# Patient Record
Sex: FEMALE | Race: WHITE
[De-identification: names, ages, dates, MRNs, and addresses within clinical notes are randomized per-mention and may not be internally consistent; named-entity substitution may affect disease eponyms.]

---

## 2018-08-01 ENCOUNTER — HOSPITAL ENCOUNTER (EMERGENCY)
Dept: HOSPITAL 89 - ER | Age: 38
Discharge: HOME | End: 2018-08-01
Payer: COMMERCIAL

## 2018-08-01 VITALS — DIASTOLIC BLOOD PRESSURE: 60 MMHG | SYSTOLIC BLOOD PRESSURE: 105 MMHG

## 2018-08-01 DIAGNOSIS — N92.0: Primary | ICD-10-CM

## 2018-08-01 LAB
INR PPP: 1.03
PLATELET COUNT, AUTOMATED: 165 K/UL (ref 150–450)

## 2018-08-01 PROCEDURE — 85610 PROTHROMBIN TIME: CPT

## 2018-08-01 PROCEDURE — 82310 ASSAY OF CALCIUM: CPT

## 2018-08-01 PROCEDURE — 82435 ASSAY OF BLOOD CHLORIDE: CPT

## 2018-08-01 PROCEDURE — 82565 ASSAY OF CREATININE: CPT

## 2018-08-01 PROCEDURE — 85730 THROMBOPLASTIN TIME PARTIAL: CPT

## 2018-08-01 PROCEDURE — 82947 ASSAY GLUCOSE BLOOD QUANT: CPT

## 2018-08-01 PROCEDURE — 99283 EMERGENCY DEPT VISIT LOW MDM: CPT

## 2018-08-01 PROCEDURE — 82040 ASSAY OF SERUM ALBUMIN: CPT

## 2018-08-01 PROCEDURE — 85025 COMPLETE CBC W/AUTO DIFF WBC: CPT

## 2018-08-01 PROCEDURE — 84450 TRANSFERASE (AST) (SGOT): CPT

## 2018-08-01 PROCEDURE — 84520 ASSAY OF UREA NITROGEN: CPT

## 2018-08-01 PROCEDURE — 84460 ALANINE AMINO (ALT) (SGPT): CPT

## 2018-08-01 PROCEDURE — 86850 RBC ANTIBODY SCREEN: CPT

## 2018-08-01 PROCEDURE — 84075 ASSAY ALKALINE PHOSPHATASE: CPT

## 2018-08-01 PROCEDURE — 84702 CHORIONIC GONADOTROPIN TEST: CPT

## 2018-08-01 PROCEDURE — 84295 ASSAY OF SERUM SODIUM: CPT

## 2018-08-01 PROCEDURE — 86901 BLOOD TYPING SEROLOGIC RH(D): CPT

## 2018-08-01 PROCEDURE — 84132 ASSAY OF SERUM POTASSIUM: CPT

## 2018-08-01 PROCEDURE — 96360 HYDRATION IV INFUSION INIT: CPT

## 2018-08-01 PROCEDURE — 86900 BLOOD TYPING SEROLOGIC ABO: CPT

## 2018-08-01 PROCEDURE — 82374 ASSAY BLOOD CARBON DIOXIDE: CPT

## 2018-08-01 PROCEDURE — 82247 BILIRUBIN TOTAL: CPT

## 2018-08-01 PROCEDURE — 84155 ASSAY OF PROTEIN SERUM: CPT

## 2018-08-01 PROCEDURE — 36415 COLL VENOUS BLD VENIPUNCTURE: CPT

## 2018-08-01 NOTE — ER REPORT
History and Physical


Time Seen By MD:  09:00


Hx. of Stated Complaint:  


pt having vag bleeding, x 2 days, getting heavier.  Now starting to get 


lightheaded and still bleeding heavily.  dx with fibroids recently


HPI/ROS


CHIEF COMPLAINT: Abnormal vaginal bleeding





HISTORY OF PRESENT ILLNESS: 38-year-old female G2 para 2 comes emergency 

Department today with increased vaginal bleeding associated menses. Patient had 

had a recent ultrasound the last 2 weeks demonstrating large intrauterine 

fibroids and she has had a long history of fibroids and menorrhagia and 

metromenorrhagia with increased bleeding she said the last 2 days she soaked 

through multiple pads and tampons she said it's more than what she is used to 

and she was concerned she also says she felt a little lightheaded when she 

stood up quickly. Patient denies being pregnant but is no longer and has not 

been on birth control pills for quite some time. Patient has no other 

significant medical history denies fever chills or sweats nausea vomiting 

diarrhea or additional complaints she has no abdominal plain





REVIEW OF SYSTEMS:


Respiratory: No cough, no dyspnea.


Cardiovascular: No chest pain, no palpitations.


Gastrointestinal: No vomiting, no abdominal pain.


Musculoskeletal: No back pain.


Remainder of the 14 system rev:  Yes


Allergies:  


Coded Allergies:  


     levofloxacin (Verified  Allergy, Unknown, 8/1/18)


Home Meds


No Active Prescriptions or Reported Meds


Reviewed Nurses Notes:  Yes


Old Medical Records Reviewed:  Yes


Hx Substance Use Disorder:  No


Hx Alcohol Use:  No


Constitutional





Vital Sign - Last 24 Hours








 8/1/18 8/1/18 8/1/18 8/1/18





 08:50 08:52 09:00 09:01


 


Temp 97.5   


 


Pulse 98   90


 


Resp 20   


 


B/P (MAP) 137/87 137/87 (104) 118/67 (84) 


 


Pulse Ox 97   96


 


O2 Delivery Room Air   


 


    





 8/1/18 8/1/18 8/1/18 8/1/18





 09:06 09:11 09:16 09:19


 


Pulse 86 97 88 


 


B/P (MAP)    108/72 (84)


 


Pulse Ox 95 94 96 





 8/1/18 8/1/18 8/1/18 8/1/18





 09:20 09:21 09:26 09:26


 


Pulse    86


 


B/P (MAP) 119/83 (95) 113/87 (96) 108/72 (84) 





   118/83 (95) 





   113/87 (96) 


 


Pulse Ox  95  94





 8/1/18 8/1/18  





 09:30 09:31  


 


Pulse  83  


 


B/P (MAP) 108/83 (91)   


 


Pulse Ox  94  








Physical Exam


  General Appearance: The patient is alert, has no immediate need for airway 

protection and no current signs of toxicity.  [ ]


Eyes: Pupils equal and round no injection.


Respiratory: Chest is non tender, lungs are clear to auscultation.


Cardiac: regular rate and rhythm [ ]


Gastrointestinal: Abdomen is soft and non tender, no masses, bowel sounds 

normal.


Musculoskeletal:  Neck: Neck is supple and non tender.


   Extremities have full range of motion and are non tender.


Skin: No rashes or lesions.


[ ]


DIFFERENTIAL DIAGNOSIS: After history and physical exam differential diagnosis 

was considered for metromenorrhagia menorrhagia 2 anemia





Medical Decision Making


Data Points


Result Diagram:  


8/1/18 0900                                                                    

            8/1/18 0900





Laboratory





Hematology








Test


  8/1/18


09:00 8/1/18


09:16


 


Red Blood Count


  5.06 M/uL


(4.17-5.56) 


 


 


Mean Corpuscular Volume


  93.2 fL


(80.0-96.0) 


 


 


Mean Corpuscular Hemoglobin


  32.4 pg


(26.0-33.0) 


 


 


Mean Corpuscular Hemoglobin


Concent 34.8 g/dL


(32.0-36.0) 


 


 


Red Cell Distribution Width


  13.2 %


(11.5-14.5) 


 


 


Mean Platelet Volume


  9.8 fL


(7.2-11.1) 


 


 


Neutrophils (%) (Auto)


  55.3 %


(39.4-72.5) 


 


 


Lymphocytes (%) (Auto)


  32.7 %


(17.6-49.6) 


 


 


Monocytes (%) (Auto)


  8.8 %


(4.1-12.4) 


 


 


Eosinophils (%) (Auto)


  2.4 %


(0.4-6.7) 


 


 


Basophils (%) (Auto)


  0.8 %


(0.3-1.4) 


 


 


Nucleated RBC Relative Count


(auto) 0.1 /100WBC 


  


 


 


Neutrophils # (Auto)


  2.7 K/uL


(2.0-7.4) 


 


 


Lymphocytes # (Auto)


  1.6 K/uL


(1.3-3.6) 


 


 


Monocytes # (Auto)


  0.4 K/uL


(0.3-1.0) 


 


 


Eosinophils # (Auto)


  0.1 K/uL


(0.0-0.5) 


 


 


Basophils # (Auto)


  0.0 K/uL


(0.0-0.1) 


 


 


Nucleated RBC Absolute Count


(auto) 0.00 K/uL 


  


 


 


Peripheral Blood Smear No Y/N  


 


Sodium Level


  140 mmol/L


(137-145) 


 


 


Potassium Level


  3.5 mmol/L


(3.5-5.0) 


 


 


Chloride Level


  104 mmol/L


() 


 


 


Carbon Dioxide Level


  24 mmol/L


(22-31) 


 


 


Blood Urea Nitrogen


  12 mg/dl


(7-18) 


 


 


Creatinine


  0.80 mg/dl


(0.52-1.04) 


 


 


Glomerular Filtration Rate


Calc > 60.0 


  


 


 


Random Glucose


  114 mg/dl


() 


 


 


Calcium Level


  9.1 mg/dl


(8.4-10.2) 


 


 


Total Bilirubin


  0.4 mg/dl


(0.2-1.3) 


 


 


Aspartate Amino Transf


(AST/SGOT) 36 U/L (0-35) 


  


 


 


Alanine Aminotransferase


(ALT/SGPT) 20 U/L (0-56) 


  


 


 


Alkaline Phosphatase 51 U/L (0-126)  


 


Total Protein


  6.7 g/dl


(6.3-8.2) 


 


 


Albumin


  4.4 g/dl


(3.5-5.0) 


 


 


Prothrombin Time


  


  13.5 seconds


(12.0-14.4)


 


Prothromb Time International


Ratio 


  1.03 


 


 


Activated Partial


Thromboplast Time 


  31 seconds


(23-35)








Chemistry








Test


  8/1/18


09:00 8/1/18


09:16


 


White Blood Count


  4.9 k/uL


(4.5-11.0) 


 


 


Red Blood Count


  5.06 M/uL


(4.17-5.56) 


 


 


Hemoglobin


  16.4 g/dL


(12.0-16.0) 


 


 


Hematocrit


  47.1 %


(34.0-47.0) 


 


 


Mean Corpuscular Volume


  93.2 fL


(80.0-96.0) 


 


 


Mean Corpuscular Hemoglobin


  32.4 pg


(26.0-33.0) 


 


 


Mean Corpuscular Hemoglobin


Concent 34.8 g/dL


(32.0-36.0) 


 


 


Red Cell Distribution Width


  13.2 %


(11.5-14.5) 


 


 


Platelet Count


  165 K/uL


(150-450) 


 


 


Mean Platelet Volume


  9.8 fL


(7.2-11.1) 


 


 


Neutrophils (%) (Auto)


  55.3 %


(39.4-72.5) 


 


 


Lymphocytes (%) (Auto)


  32.7 %


(17.6-49.6) 


 


 


Monocytes (%) (Auto)


  8.8 %


(4.1-12.4) 


 


 


Eosinophils (%) (Auto)


  2.4 %


(0.4-6.7) 


 


 


Basophils (%) (Auto)


  0.8 %


(0.3-1.4) 


 


 


Nucleated RBC Relative Count


(auto) 0.1 /100WBC 


  


 


 


Neutrophils # (Auto)


  2.7 K/uL


(2.0-7.4) 


 


 


Lymphocytes # (Auto)


  1.6 K/uL


(1.3-3.6) 


 


 


Monocytes # (Auto)


  0.4 K/uL


(0.3-1.0) 


 


 


Eosinophils # (Auto)


  0.1 K/uL


(0.0-0.5) 


 


 


Basophils # (Auto)


  0.0 K/uL


(0.0-0.1) 


 


 


Nucleated RBC Absolute Count


(auto) 0.00 K/uL 


  


 


 


Peripheral Blood Smear No Y/N  


 


Glomerular Filtration Rate


Calc > 60.0 


  


 


 


Calcium Level


  9.1 mg/dl


(8.4-10.2) 


 


 


Total Bilirubin


  0.4 mg/dl


(0.2-1.3) 


 


 


Aspartate Amino Transf


(AST/SGOT) 36 U/L (0-35) 


  


 


 


Alanine Aminotransferase


(ALT/SGPT) 20 U/L (0-56) 


  


 


 


Alkaline Phosphatase 51 U/L (0-126)  


 


Total Protein


  6.7 g/dl


(6.3-8.2) 


 


 


Albumin


  4.4 g/dl


(3.5-5.0) 


 


 


Prothrombin Time


  


  13.5 seconds


(12.0-14.4)


 


Prothromb Time International


Ratio 


  1.03 


 


 


Activated Partial


Thromboplast Time 


  31 seconds


(23-35)








Coagulation








Test


  8/1/18


09:16


 


Prothrombin Time 13.5 seconds 


 


Prothromb Time International


Ratio 1.03 


 


 


Activated Partial


Thromboplast Time 31 seconds 


 











ED Course/Re-evaluation


ED Course


ED clinical course 38-year-old female presents with menorrhagia concerned of 

menorrhagia 2 anemia however hemoglobin is greater than 16 she is on iron 

supplementation she had orthostatics which were done showed a slight bump in 

her heart rate give a liter fluid she's resolved she is asymptomatic otherwise 

coags were negative she's had a recent ultrasound no indication to do a further 

1 she's traveling back home she is safe to do so she is following up in the 

next 10 days with her OB/GYN considering hysterectomy is definitive course of 

care


Decision to Disposition Date:  Aug 1, 2018


Decision to Disposition Time:  09:51





Depart


Departure


Latest Vital Signs





Vital Signs








  Date Time  Temp Pulse Resp B/P (MAP) Pulse Ox O2 Delivery O2 Flow Rate FiO2


 


8/1/18 09:31  83   94   


 


8/1/18 09:30    108/83 (91)    


 


8/1/18 08:50 97.5  20   Room Air  








Impression:  


 Primary Impression:  


 Menorrhagia


Condition:  Improved


Disposition:  HOME OR SELF-CARE


Referrals:  


YANG BAIRD MD


10 Days


New Scripts


No Active Prescriptions or Reported Meds


Patient Instructions:  Menorrhagia (ED)











BELEN GALEAS MD Aug 1, 2018 09:10